# Patient Record
Sex: MALE | Race: WHITE | Employment: FULL TIME | URBAN - METROPOLITAN AREA
[De-identification: names, ages, dates, MRNs, and addresses within clinical notes are randomized per-mention and may not be internally consistent; named-entity substitution may affect disease eponyms.]

---

## 2024-01-02 ENCOUNTER — OFFICE VISIT (OUTPATIENT)
Age: 39
End: 2024-01-02

## 2024-01-02 VITALS
TEMPERATURE: 98.3 F | RESPIRATION RATE: 20 BRPM | OXYGEN SATURATION: 97 % | WEIGHT: 168.4 LBS | HEART RATE: 91 BPM | SYSTOLIC BLOOD PRESSURE: 125 MMHG | DIASTOLIC BLOOD PRESSURE: 85 MMHG

## 2024-01-02 DIAGNOSIS — L30.4 INTERTRIGO: Primary | ICD-10-CM

## 2024-01-02 RX ORDER — CLOTRIMAZOLE 1 %
CREAM (GRAM) TOPICAL
Qty: 113 G | Refills: 0 | Status: SHIPPED | OUTPATIENT
Start: 2024-01-02 | End: 2024-01-09

## 2024-01-02 RX ORDER — CLOTRIMAZOLE 1 %
CREAM (GRAM) TOPICAL
Qty: 113 G | Refills: 0 | Status: SHIPPED | OUTPATIENT
Start: 2024-01-02 | End: 2024-01-02 | Stop reason: SDUPTHER

## 2024-01-02 RX ORDER — TRIAMCINOLONE ACETONIDE 0.25 MG/G
OINTMENT TOPICAL
Qty: 80 G | Refills: 0 | Status: SHIPPED | OUTPATIENT
Start: 2024-01-02 | End: 2024-01-09

## 2024-01-02 RX ORDER — METHYLPREDNISOLONE 4 MG/1
TABLET ORAL
COMMUNITY
Start: 2023-12-20

## 2024-01-02 RX ORDER — TRIAMCINOLONE ACETONIDE 0.25 MG/G
OINTMENT TOPICAL
Qty: 80 G | Refills: 0 | Status: SHIPPED | OUTPATIENT
Start: 2024-01-02 | End: 2024-01-02 | Stop reason: SDUPTHER

## 2024-01-02 RX ORDER — CEPHALEXIN 500 MG/1
CAPSULE ORAL
COMMUNITY
Start: 2023-12-20

## 2024-01-02 NOTE — PROGRESS NOTES
Subjective: (As above and below)     The patient/guardian gave verbal consent to treat.        Chief Complaint   Patient presents with   • Rash     C/O rash on left and right armpits, arms, neck and scalp area x  6 months increasing x 2 months      Harley Mendez is a 38 y.o. male who presents for evaluation of :rash primarily under armpits R and L  Onset 6 months ago  Has been pouring hydrogen peroxide and this hasn't helped.  Rash overall worsening  Was seen at another urgent care within past 2 weeks ([facility] urgent care) put on cephalexin and oral prednisone.  Felt like rash did improve some then has re appeared.  Still has 1 more day of antibiotics  No new detergents or known exposures  Had similar rash at base of scrotum  No hx of psoriasis or eczema  Rash only itchy. Rash is sometimes moist/produces clear moisture from under arms .No pain, fever, chills or other symptoms.          Review of Systems    Review of Systems - negative except as listed above    Reviewed PmHx, RxHx, FmHx, SocHx, AllgHx and updated in chart.  History reviewed. No pertinent family history.  History reviewed. No pertinent past medical history.   Social History     Socioeconomic History   • Marital status: Single     Spouse name: None   • Number of children: None   • Years of education: None   • Highest education level: None   Tobacco Use   • Smoking status: Never     Passive exposure: Never   • Smokeless tobacco: Never   Substance and Sexual Activity   • Alcohol use: Yes   • Drug use: Never          Current Outpatient Medications   Medication Sig   • cephALEXin (KEFLEX) 500 MG capsule TAKE 1 CAPSULE BY MOUTH 4 TIMES A DAY FOR 7 DAYS   • methylPREDNISolone (MEDROL DOSEPACK) 4 MG tablet TAKE 6 TABLETS ON DAY 1 AS DIRECTED ON PACKAGE AND DECREASE BY 1 TAB EACH DAY FOR A TOTAL OF 6 DAYS   • triamcinolone (KENALOG) 0.025 % ointment Apply topically 2 times daily for 1 week   • clotrimazole (LOTRIMIN AF) 1 % cream Apply topically 2 times daily